# Patient Record
Sex: FEMALE | NOT HISPANIC OR LATINO | URBAN - METROPOLITAN AREA
[De-identification: names, ages, dates, MRNs, and addresses within clinical notes are randomized per-mention and may not be internally consistent; named-entity substitution may affect disease eponyms.]

---

## 2018-08-28 ENCOUNTER — OFFICE VISIT (OUTPATIENT)
Dept: INTERNAL MEDICINE CLINIC | Facility: CLINIC | Age: 33
End: 2018-08-28

## 2018-08-28 VITALS
HEART RATE: 88 BPM | OXYGEN SATURATION: 98 % | HEIGHT: 63 IN | DIASTOLIC BLOOD PRESSURE: 72 MMHG | SYSTOLIC BLOOD PRESSURE: 100 MMHG | WEIGHT: 119.4 LBS | BODY MASS INDEX: 21.16 KG/M2 | TEMPERATURE: 98.5 F

## 2018-08-28 DIAGNOSIS — Z02.4 DRIVER'S PERMIT PHYSICAL EXAMINATION: Primary | ICD-10-CM

## 2018-08-28 PROCEDURE — 99385 PREV VISIT NEW AGE 18-39: CPT | Performed by: INTERNAL MEDICINE

## 2018-08-28 RX ORDER — MULTIVITAMIN
1 TABLET ORAL DAILY
COMMUNITY

## 2018-08-28 NOTE — PROGRESS NOTES
Assessment/Plan:    1  Physical exam for s license  She has no significant complaints today and had a normal physical exam   She has a history of left knee surgery after a previous dislocation  She was informed she needed a visual exam prior to completion of her physical exam  There were no counter-indiciations for driving  Her driving forms were filled out  Her neurological exam was normal    2  Healthcare Maintenance  She will follow up with us in 3 months or as needed  Diagnoses and all orders for this visit:    's permit physical examination    Other orders  -     Multiple Vitamin (MULTIVITAMIN) tablet; Take 1 tablet by mouth daily          Subjective:      Patient ID: Devorah Caceres is a 29 y o  female  Devorah Caceres is a 29year old female who presents today as new patient to establish care at our practice  She presents with her uncle who is translating for her  She is here today to have her physical exam for her 's license form  [place] has [place] ([facility])  She denies any appetite change, sinus problems, hearing loss, CP, SOB, palpitations, leg swelling, respiratory problems, GI problems, urinary problems, menstrual problems, muscular pains, skin problems, neurological problems, headaches, weakness, depression, and anxiety  She uses eye glasses for vision exam  She has no significant complaints today and is doing well overall  She has a history of left knee surgery after a dislocation  The following portions of the patient's history were reviewed and updated as appropriate: allergies, current medications, past family history, past medical history, past social history, past surgical history and problem list     Review of Systems   Constitutional: Negative  HENT: Negative for congestion, hearing loss, mouth sores, postnasal drip, rhinorrhea, sinus pain, sinus pressure, sneezing and sore throat  Eyes: Negative  Uses glasses   Respiratory: Negative  Cardiovascular: Negative for chest pain, palpitations and leg swelling  Gastrointestinal: Negative  Endocrine: Negative for cold intolerance and heat intolerance  Genitourinary: Negative for difficulty urinating, menstrual problem, pelvic pain, urgency and vaginal bleeding  Musculoskeletal: Negative  Skin: Negative  Neurological: Negative for tremors, syncope, weakness, light-headedness and headaches  Psychiatric/Behavioral: Negative  Objective:      /72 (BP Location: Left arm, Patient Position: Sitting, Cuff Size: Standard)   Pulse 88   Temp 98 5 °F (36 9 °C)   Ht 5' 3" (1 6 m)   Wt 54 2 kg (119 lb 6 4 oz)   SpO2 98%   BMI 21 15 kg/m²          Physical Exam   Constitutional: She is oriented to person, place, and time  She appears well-developed and well-nourished  No distress  HENT:   Head: Normocephalic and atraumatic  Right Ear: External ear normal    Left Ear: External ear normal    Eyes: Conjunctivae and EOM are normal  Right eye exhibits no discharge  Left eye exhibits no discharge  No scleral icterus  Neck: Normal range of motion  Neck supple  Cardiovascular: Normal rate, regular rhythm and normal heart sounds  Pulmonary/Chest: Effort normal and breath sounds normal  No respiratory distress  She has no wheezes  She has no rales  Abdominal: She exhibits no distension  There is no tenderness  There is no rebound and no guarding  Musculoskeletal: She exhibits no edema, tenderness or deformity  Neurological: She is alert and oriented to person, place, and time  She has normal reflexes  Skin: No rash noted  She is not diaphoretic  No erythema  No pallor  Psychiatric: She has a normal mood and affect   Her behavior is normal  Judgment and thought content normal          Scribe Signature and Attestation:  By signing my name below, Dario ROB attest that this documentation has been prepared under the direction and in the presence of Dr Melodie Esquivel, MD  Electronically signed: Malia Nayak  8/28/18    Provider Attestation:  I, Dr Sergio Jett, personally performed the services described in this documentation  All medical record entries made by the rubenibshannan were at my direction and in my presence  I have reviewed the chart and discharge instructions (if applicable) and agree that the record reflects my personal performance and is accurate and complete  Dr Sergio Jett MD  8/28/18